# Patient Record
Sex: MALE | Race: WHITE | Employment: UNEMPLOYED | URBAN - METROPOLITAN AREA
[De-identification: names, ages, dates, MRNs, and addresses within clinical notes are randomized per-mention and may not be internally consistent; named-entity substitution may affect disease eponyms.]

---

## 2019-06-08 ENCOUNTER — APPOINTMENT (OUTPATIENT)
Dept: CT IMAGING | Age: 29
End: 2019-06-08
Payer: COMMERCIAL

## 2019-06-08 ENCOUNTER — HOSPITAL ENCOUNTER (EMERGENCY)
Age: 29
Discharge: HOME | End: 2019-06-08
Attending: EMERGENCY MEDICINE
Payer: COMMERCIAL

## 2019-06-08 ENCOUNTER — APPOINTMENT (OUTPATIENT)
Dept: GENERAL RADIOLOGY | Age: 29
End: 2019-06-08
Payer: COMMERCIAL

## 2019-06-08 VITALS
TEMPERATURE: 98.1 F | DIASTOLIC BLOOD PRESSURE: 70 MMHG | RESPIRATION RATE: 16 BRPM | OXYGEN SATURATION: 98 % | HEIGHT: 63 IN | WEIGHT: 120 LBS | BODY MASS INDEX: 21.26 KG/M2 | HEART RATE: 52 BPM | SYSTOLIC BLOOD PRESSURE: 107 MMHG

## 2019-06-08 DIAGNOSIS — K08.89 TOOTH PAIN: ICD-10-CM

## 2019-06-08 DIAGNOSIS — S06.0X0A CONCUSSION WITHOUT LOSS OF CONSCIOUSNESS, INITIAL ENCOUNTER: ICD-10-CM
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
## 2019-06-08 DIAGNOSIS — H11.31 SUBCONJUNCTIVAL HEMORRHAGE OF RIGHT EYE: ICD-10-CM

## 2019-06-08 DIAGNOSIS — R91.1 PULMONARY NODULE: ICD-10-CM

## 2019-06-08 PROCEDURE — 70486 CT MAXILLOFACIAL W/O DYE: CPT

## 2019-06-08 PROCEDURE — 90471 IMMUNIZATION ADMIN: CPT | Performed by: EMERGENCY MEDICINE

## 2019-06-08 PROCEDURE — 99284 EMERGENCY DEPT VISIT MOD MDM: CPT

## 2019-06-08 PROCEDURE — 90715 TDAP VACCINE 7 YRS/> IM: CPT | Performed by: EMERGENCY MEDICINE

## 2019-06-08 PROCEDURE — 72125 CT NECK SPINE W/O DYE: CPT

## 2019-06-08 PROCEDURE — 6360000002 HC RX W HCPCS: Performed by: EMERGENCY MEDICINE

## 2019-06-08 PROCEDURE — 71045 X-RAY EXAM CHEST 1 VIEW: CPT

## 2019-06-08 PROCEDURE — 70450 CT HEAD/BRAIN W/O DYE: CPT

## 2019-06-08 RX ORDER — ONDANSETRON 4 MG/1
4 TABLET, ORALLY DISINTEGRATING ORAL EVERY 8 HOURS PRN
Qty: 15 TABLET | Refills: 0 | Status: SHIPPED | OUTPATIENT
Start: 2019-06-08 | End: 2019-10-02

## 2019-06-08 RX ORDER — PENICILLIN V POTASSIUM 500 MG/1
500 TABLET ORAL 4 TIMES DAILY
Qty: 40 TABLET | Refills: 0 | Status: SHIPPED | OUTPATIENT
Start: 2019-06-08 | End: 2019-06-18

## 2019-06-08 RX ADMIN — TETANUS TOXOID, REDUCED DIPHTHERIA TOXOID AND ACELLULAR PERTUSSIS VACCINE, ADSORBED 0.5 ML: 5; 2.5; 8; 8; 2.5 SUSPENSION INTRAMUSCULAR at 09:47

## 2019-06-08 ASSESSMENT — ENCOUNTER SYMPTOMS
EYE REDNESS: 1
COUGH: 0
ABDOMINAL PAIN: 0
VOMITING: 1
SHORTNESS OF BREATH: 0
SORE THROAT: 0
FACIAL SWELLING: 1
DIARRHEA: 0
RHINORRHEA: 0
CONSTIPATION: 0
BACK PAIN: 0
NAUSEA: 1

## 2019-06-08 ASSESSMENT — PAIN SCALES - GENERAL: PAINLEVEL_OUTOF10: 7

## 2019-06-08 ASSESSMENT — PAIN DESCRIPTION - LOCATION: LOCATION: HEAD

## 2019-06-08 ASSESSMENT — PAIN DESCRIPTION - PAIN TYPE: TYPE: ACUTE PAIN

## 2019-09-26 VITALS
RESPIRATION RATE: 14 BRPM | HEART RATE: 75 BPM | SYSTOLIC BLOOD PRESSURE: 125 MMHG | TEMPERATURE: 98.1 F | DIASTOLIC BLOOD PRESSURE: 83 MMHG | OXYGEN SATURATION: 98 % | WEIGHT: 120 LBS | HEIGHT: 63 IN | BODY MASS INDEX: 21.26 KG/M2

## 2019-09-26 ASSESSMENT — PAIN DESCRIPTION - LOCATION: LOCATION: FACE

## 2019-09-26 ASSESSMENT — PAIN DESCRIPTION - PAIN TYPE: TYPE: ACUTE PAIN

## 2019-09-27 ENCOUNTER — APPOINTMENT (OUTPATIENT)
Dept: CT IMAGING | Age: 29
End: 2019-09-27
Payer: COMMERCIAL

## 2019-09-27 ENCOUNTER — HOSPITAL ENCOUNTER (EMERGENCY)
Age: 29
Discharge: HOME | End: 2019-09-27
Payer: COMMERCIAL

## 2019-09-27 DIAGNOSIS — L03.211 FACIAL CELLULITIS: Primary | ICD-10-CM

## 2019-09-27 PROCEDURE — 99282 EMERGENCY DEPT VISIT SF MDM: CPT

## 2019-09-27 RX ORDER — SODIUM CHLORIDE 0.9 % (FLUSH) 0.9 %
10 SYRINGE (ML) INJECTION 2 TIMES DAILY
Status: DISCONTINUED | OUTPATIENT
Start: 2019-09-27 | End: 2019-09-27 | Stop reason: HOSPADM

## 2019-10-02 ENCOUNTER — OFFICE VISIT (OUTPATIENT)
Dept: FAMILY MEDICINE CLINIC | Age: 29
End: 2019-10-02
Payer: COMMERCIAL

## 2019-10-02 VITALS
TEMPERATURE: 97.8 F | WEIGHT: 112.4 LBS | OXYGEN SATURATION: 98 % | HEART RATE: 82 BPM | BODY MASS INDEX: 20.68 KG/M2 | HEIGHT: 62 IN | DIASTOLIC BLOOD PRESSURE: 80 MMHG | SYSTOLIC BLOOD PRESSURE: 132 MMHG

## 2019-10-02 DIAGNOSIS — Z76.89 ESTABLISHING CARE WITH NEW DOCTOR, ENCOUNTER FOR: ICD-10-CM

## 2019-10-02 DIAGNOSIS — S32.008B: Primary | ICD-10-CM

## 2019-10-02 DIAGNOSIS — J45.20 MILD INTERMITTENT ASTHMA WITHOUT COMPLICATION: ICD-10-CM

## 2019-10-02 DIAGNOSIS — S22.008A: ICD-10-CM

## 2019-10-02 DIAGNOSIS — L03.211 CELLULITIS OF FACE: ICD-10-CM

## 2019-10-02 DIAGNOSIS — F19.10 POLYSUBSTANCE ABUSE (HCC): ICD-10-CM

## 2019-10-02 DIAGNOSIS — F17.200 SMOKER: ICD-10-CM

## 2019-10-02 PROCEDURE — G0444 DEPRESSION SCREEN ANNUAL: HCPCS | Performed by: FAMILY MEDICINE

## 2019-10-02 PROCEDURE — 99203 OFFICE O/P NEW LOW 30 MIN: CPT | Performed by: FAMILY MEDICINE

## 2019-10-02 RX ORDER — CEPHALEXIN 500 MG/1
500 CAPSULE ORAL
COMMUNITY
Start: 2019-09-27 | End: 2019-10-02 | Stop reason: SDUPTHER

## 2019-10-02 RX ORDER — METHYLPREDNISOLONE 4 MG/1
TABLET ORAL
COMMUNITY
Start: 2019-09-25 | End: 2021-01-13

## 2019-10-02 RX ORDER — ALBUTEROL SULFATE 90 UG/1
2 AEROSOL, METERED RESPIRATORY (INHALATION) EVERY 6 HOURS PRN
Qty: 1 INHALER | Refills: 2 | Status: SHIPPED | OUTPATIENT
Start: 2019-10-02 | End: 2021-01-13

## 2019-10-02 RX ORDER — CEPHALEXIN 500 MG/1
CAPSULE ORAL
Refills: 0 | COMMUNITY
Start: 2019-09-27 | End: 2019-10-02 | Stop reason: SDUPTHER

## 2019-10-02 RX ORDER — ALBUTEROL SULFATE 90 UG/1
2 AEROSOL, METERED RESPIRATORY (INHALATION)
COMMUNITY
Start: 2015-12-01 | End: 2019-10-02 | Stop reason: SDUPTHER

## 2019-10-02 RX ORDER — CEPHALEXIN 500 MG/1
500 CAPSULE ORAL 3 TIMES DAILY
Qty: 21 CAPSULE | Refills: 0 | Status: SHIPPED | OUTPATIENT
Start: 2019-10-02 | End: 2019-10-09

## 2019-10-02 RX ORDER — CYCLOBENZAPRINE HCL 5 MG
5 TABLET ORAL
COMMUNITY
Start: 2019-09-25 | End: 2019-10-05

## 2019-10-02 ASSESSMENT — PATIENT HEALTH QUESTIONNAIRE - PHQ9
2. FEELING DOWN, DEPRESSED OR HOPELESS: 1
SUM OF ALL RESPONSES TO PHQ QUESTIONS 1-9: 8
3. TROUBLE FALLING OR STAYING ASLEEP: 1
SUM OF ALL RESPONSES TO PHQ QUESTIONS 1-9: 8
8. MOVING OR SPEAKING SO SLOWLY THAT OTHER PEOPLE COULD HAVE NOTICED. OR THE OPPOSITE, BEING SO FIGETY OR RESTLESS THAT YOU HAVE BEEN MOVING AROUND A LOT MORE THAN USUAL: 1
SUM OF ALL RESPONSES TO PHQ9 QUESTIONS 1 & 2: 3
6. FEELING BAD ABOUT YOURSELF - OR THAT YOU ARE A FAILURE OR HAVE LET YOURSELF OR YOUR FAMILY DOWN: 0
5. POOR APPETITE OR OVEREATING: 1
9. THOUGHTS THAT YOU WOULD BE BETTER OFF DEAD, OR OF HURTING YOURSELF: 0
1. LITTLE INTEREST OR PLEASURE IN DOING THINGS: 2
10. IF YOU CHECKED OFF ANY PROBLEMS, HOW DIFFICULT HAVE THESE PROBLEMS MADE IT FOR YOU TO DO YOUR WORK, TAKE CARE OF THINGS AT HOME, OR GET ALONG WITH OTHER PEOPLE: 1
4. FEELING TIRED OR HAVING LITTLE ENERGY: 1
7. TROUBLE CONCENTRATING ON THINGS, SUCH AS READING THE NEWSPAPER OR WATCHING TELEVISION: 1

## 2019-10-02 ASSESSMENT — ENCOUNTER SYMPTOMS
BACK PAIN: 1
SHORTNESS OF BREATH: 0
CONSTIPATION: 0
COUGH: 0
ABDOMINAL PAIN: 0
DIARRHEA: 0
VOMITING: 0

## 2019-10-16 ENCOUNTER — HOSPITAL ENCOUNTER (OUTPATIENT)
Age: 29
Discharge: HOME | End: 2019-10-16
Payer: COMMERCIAL

## 2019-10-16 LAB
ALBUMIN SERPL-MCNC: 4.3 GM/DL (ref 3.4–5)
ALP BLD-CCNC: 95 IU/L (ref 40–129)
ALT SERPL-CCNC: 24 U/L (ref 10–40)
AST SERPL-CCNC: 19 IU/L (ref 15–37)
BILIRUB SERPL-MCNC: 0.2 MG/DL (ref 0–1)
BILIRUBIN DIRECT: 0.2 MG/DL (ref 0–0.3)
BILIRUBIN, INDIRECT: 0 MG/DL (ref 0–0.7)
HEPATITIS B SURFACE ANTIGEN: NON REACTIVE
HEPATITIS C ANTIBODY: NON REACTIVE
TOTAL PROTEIN: 6.2 GM/DL (ref 6.4–8.2)

## 2019-10-16 PROCEDURE — 87340 HEPATITIS B SURFACE AG IA: CPT

## 2019-10-16 PROCEDURE — 86708 HEPATITIS A ANTIBODY: CPT

## 2019-10-16 PROCEDURE — 86803 HEPATITIS C AB TEST: CPT

## 2019-10-16 PROCEDURE — 87389 HIV-1 AG W/HIV-1&-2 AB AG IA: CPT

## 2019-10-16 PROCEDURE — 36415 COLL VENOUS BLD VENIPUNCTURE: CPT

## 2019-10-16 PROCEDURE — 80076 HEPATIC FUNCTION PANEL: CPT

## 2019-10-17 LAB
HAV AB SERPL IA-ACNC: NEGATIVE
HAV AB SERPL IA-ACNC: NORMAL
Lab: NORMAL
Lab: NORMAL
TEST NAME: NORMAL

## 2020-10-15 ENCOUNTER — HOSPITAL ENCOUNTER (OUTPATIENT)
Age: 30
Discharge: HOME | End: 2020-10-15
Payer: COMMERCIAL

## 2020-10-15 LAB
ALBUMIN SERPL-MCNC: 4.8 GM/DL (ref 3.4–5)
ALP BLD-CCNC: 85 IU/L (ref 40–129)
ALT SERPL-CCNC: 7 U/L (ref 10–40)
AST SERPL-CCNC: 24 IU/L (ref 15–37)
BILIRUB SERPL-MCNC: 0.2 MG/DL (ref 0–1)
BILIRUBIN DIRECT: 0.2 MG/DL (ref 0–0.3)
BILIRUBIN, INDIRECT: 0 MG/DL (ref 0–0.7)
HEPATITIS B SURFACE ANTIGEN: NON REACTIVE
HEPATITIS C ANTIBODY: NON REACTIVE
TOTAL PROTEIN: 6.8 GM/DL (ref 6.4–8.2)

## 2020-10-15 PROCEDURE — 80076 HEPATIC FUNCTION PANEL: CPT

## 2020-10-15 PROCEDURE — 86708 HEPATITIS A ANTIBODY: CPT

## 2020-10-15 PROCEDURE — 36415 COLL VENOUS BLD VENIPUNCTURE: CPT

## 2020-10-15 PROCEDURE — 87340 HEPATITIS B SURFACE AG IA: CPT

## 2020-10-15 PROCEDURE — 86803 HEPATITIS C AB TEST: CPT

## 2020-10-15 PROCEDURE — 87389 HIV-1 AG W/HIV-1&-2 AB AG IA: CPT

## 2020-10-16 LAB — HIV SCREEN: NON REACTIVE

## 2020-10-17 LAB — HAV AB SERPL IA-ACNC: NEGATIVE

## 2021-01-13 ENCOUNTER — HOSPITAL ENCOUNTER (EMERGENCY)
Age: 31
Discharge: HOME | End: 2021-01-13
Attending: EMERGENCY MEDICINE
Payer: COMMERCIAL

## 2021-01-13 ENCOUNTER — OFFICE VISIT (OUTPATIENT)
Dept: PRIMARY CARE CLINIC | Age: 31
End: 2021-01-13
Payer: COMMERCIAL

## 2021-01-13 ENCOUNTER — HOSPITAL ENCOUNTER (OUTPATIENT)
Age: 31
Setting detail: SPECIMEN
Discharge: HOME | End: 2021-01-13
Payer: COMMERCIAL

## 2021-01-13 VITALS
HEIGHT: 63 IN | WEIGHT: 145 LBS | SYSTOLIC BLOOD PRESSURE: 132 MMHG | BODY MASS INDEX: 25.69 KG/M2 | RESPIRATION RATE: 16 BRPM | TEMPERATURE: 96.6 F | OXYGEN SATURATION: 98 % | HEART RATE: 72 BPM | DIASTOLIC BLOOD PRESSURE: 82 MMHG

## 2021-01-13 VITALS — OXYGEN SATURATION: 97 % | HEART RATE: 61 BPM | TEMPERATURE: 96.4 F

## 2021-01-13 DIAGNOSIS — R43.2 LOSS OF TASTE: ICD-10-CM

## 2021-01-13 DIAGNOSIS — R11.2 INTRACTABLE VOMITING WITH NAUSEA, UNSPECIFIED VOMITING TYPE: Primary | ICD-10-CM

## 2021-01-13 DIAGNOSIS — R68.89 FLU-LIKE SYMPTOMS: Primary | ICD-10-CM

## 2021-01-13 DIAGNOSIS — R43.0 LOSS OF SMELL: ICD-10-CM

## 2021-01-13 DIAGNOSIS — Z20.828 SARS-ASSOCIATED CORONAVIRUS EXPOSURE: ICD-10-CM

## 2021-01-13 PROCEDURE — 99213 OFFICE O/P EST LOW 20 MIN: CPT | Performed by: NURSE PRACTITIONER

## 2021-01-13 PROCEDURE — G8427 DOCREV CUR MEDS BY ELIG CLIN: HCPCS | Performed by: NURSE PRACTITIONER

## 2021-01-13 PROCEDURE — 99284 EMERGENCY DEPT VISIT MOD MDM: CPT

## 2021-01-13 PROCEDURE — G8484 FLU IMMUNIZE NO ADMIN: HCPCS | Performed by: NURSE PRACTITIONER

## 2021-01-13 PROCEDURE — U0002 COVID-19 LAB TEST NON-CDC: HCPCS

## 2021-01-13 PROCEDURE — 4004F PT TOBACCO SCREEN RCVD TLK: CPT | Performed by: NURSE PRACTITIONER

## 2021-01-13 PROCEDURE — 6370000000 HC RX 637 (ALT 250 FOR IP): Performed by: EMERGENCY MEDICINE

## 2021-01-13 PROCEDURE — G8419 CALC BMI OUT NRM PARAM NOF/U: HCPCS | Performed by: NURSE PRACTITIONER

## 2021-01-13 RX ORDER — ONDANSETRON 4 MG/1
8 TABLET, ORALLY DISINTEGRATING ORAL ONCE
Status: COMPLETED | OUTPATIENT
Start: 2021-01-13 | End: 2021-01-13

## 2021-01-13 RX ORDER — ONDANSETRON 4 MG/1
4 TABLET, ORALLY DISINTEGRATING ORAL EVERY 6 HOURS PRN
Qty: 20 TABLET | Refills: 0 | Status: SHIPPED | OUTPATIENT
Start: 2021-01-13 | End: 2021-01-20

## 2021-01-13 RX ADMIN — ONDANSETRON 8 MG: 4 TABLET, ORALLY DISINTEGRATING ORAL at 09:31

## 2021-01-13 ASSESSMENT — PAIN DESCRIPTION - LOCATION: LOCATION: HEAD

## 2021-01-13 ASSESSMENT — PAIN DESCRIPTION - DESCRIPTORS: DESCRIPTORS: ACHING

## 2021-01-13 ASSESSMENT — PAIN DESCRIPTION - PAIN TYPE: TYPE: ACUTE PAIN

## 2021-01-13 NOTE — ED PROVIDER NOTES
Patient Name: Rubina Menendez      HPI:   Patient presents to the ED with chief complaint of headache, generalized myalgias and arthralgias, malaise and fatigue, intractable vomiting and low-grade fever. [facility], was released 3 days ago, and symptoms began yesterday. States his major complaint has been intractable vomiting, he said multiple episodes since last night. Is having difficulty keeping food and drinks down. He was able to take some Motrin this morning for the aches, otherwise has had nothing to eat or drink. Does not know if he was exposed to coronavirus while in the senior living, but says they were not practicing social distancing, wearing masks or practicing handwashing. Also reports loss of sense of taste and smell. REVIEW OF SYSTEMS    General: Chills and subjective fever. Afebrile here, who did take Motrin prior to arrival  Eyes:  No recent vison changes  ENT:  No sore throat, no nasal congestion  Cardiovascular: No chest pain, no palpitations  Respiratory:   No shortness of breath, states he has a chronic cough from tobacco use, but no change in baseline  Gastrointestinal: No abdominal pain, nausea vomiting as above, no diarrhea  Musculoskeletal: As above  Skin:  No rash, no pruritis  Neurologic:  No speech problems, no headache, no extremity weakness, no difficulty walking  Genitourinary:  No dysuria or hematuria present  Extremities:  No calf pain, no edema, No pain      Past Medical  Past Medical History:   Diagnosis Date    Asthma        Past Surgical History  Past Surgical History:   Procedure Laterality Date    FINGER SURGERY Left     thumb ligament       Past Family History  History reviewed. No pertinent family history.     Social History  Social History     Socioeconomic History    Marital status: Single     Spouse name: Not on file    Number of children: Not on file    Years of education: Not on file    Highest education level: Not on file Wt 145 lb (65.8 kg)   SpO2 98%   BMI 25.69 kg/m²    Constitutional: Well-developed, well-nourished and in no acute distress  Head: Atraumatic, normocephalic. Eyes: PERRL. EOMI. no scleral icterus. Neck/Lymphatics: Supple, no JVD, No lymphadenopathy  Cardiovascular: Regular rate and normal rhythm. No murmur or gallop noted. No JVD. Peripheral Vascular: Pulses +2 and equal on both radial arteries, and both dorsalis pedis arteries bilaterally. Respiratory: Clear to auscultation bilaterally, no increased work of breathing or respiratory distress noted. No costal retractions. Speaking full sentences comfortably. Abdomen: Abdomen soft and without distention. No rebound or guarding noted. No abdominal bruit or pulsatile abdominal mass. Musculoskeletal: No lower extremity edema, no calf tenderness or Homans' sign noted. Integument:  Warm, Dry, Intact, appropriate skin turgor with no mottling  Neurologic:  Alert & oriented x3 , no gross or focal neurologic deficits noted, no ataxia. Cranial nerves II through XII are intact. MEDICAL DECISION MAKING/ ASSESSMENT AND PLAN    Patiently recently in the [facility] for 3 days, released 3 days ago and beginning yesterday has multiple flulike symptoms which would be consistent with coronavirus exposure. Most concerning would be a loss of sense of taste and smell. Patient was given Zofran here for his nausea and vomiting. Was also given information on coronavirus exposure, self quarantine, and information on the [address] [place] [address] testing by appointment. Clinically, physical exam is unremarkable. Review of systems is suggestive of viral infection, but see no evidence of pneumonia. To include no hypoxemia, no tachypnea, clear breath sounds and no hypoxemia. DIAGNOSIS    1. Intractable vomiting with nausea, unspecified vomiting type    2.  SARS-associated coronavirus exposure            GILMER PEREZ MD, CPE, [address], [address] [facility]  [facility] and [address], [place]  01/13/21 5910

## 2021-01-13 NOTE — PROGRESS NOTES
PHYSICAL EXAM:  Physical Exam  Constitutional:       Appearance: Normal appearance. HENT:      Head: Normocephalic. Neck:      Musculoskeletal: Neck supple. Cardiovascular:      Rate and Rhythm: Normal rate and regular rhythm. Heart sounds: Normal heart sounds. Pulmonary:      Effort: Pulmonary effort is normal.      Breath sounds: Normal breath sounds. Skin:     General: Skin is warm and dry. Neurological:      Mental Status: He is alert and oriented to person, place, and time. Psychiatric:         Mood and Affect: Mood normal.         Behavior: Behavior normal.         ASSESSMENT/PLAN:  1. Flu-like symptoms  Your COVID 19 test can take 3-5 days for the results come back. We ask that you make a Mychart page and view your test results this way. You will need to Self quarantine until you know your results. Increase fluids rest  Saline nasal spray as directed  Warm salt gargles for throat discomfort  Monitor temperature twice a day  Tylenol for fevers and/or discomfort. If symptoms are worse -Go to the ER. Follow up with your primary doctor  - COVID-19 Ambulatory    2. Loss of taste  - COVID-19 Ambulatory    3. Loss of smell  - COVID-19 Ambulatory      FOLLOW-UP:  Return if symptoms worsen or fail to improve.     In addition to other information, the printed after visit summary provided to the patient includes:  [x] COVID-19 Self care instructions  [x] COVID-19 General patient information

## 2021-01-13 NOTE — PATIENT INSTRUCTIONS
Your COVID 19 test can take 3-5 days for the results come back. We ask that you make a Mychart page and view your test results this way. You will need to Self quarantine until you know your results. Increase fluids rest  Saline nasal spray as directed  Warm salt gargles for throat discomfort  Monitor temperature twice a day  Tylenol for fevers and/or discomfort. If symptoms are worse -Go to the ER. Follow up with your primary doctor    To Whom it May Concern:    Destiny Will has been tested for COVID on 01/13/21. They may NOT return to work until their lab test results back and they been fever free for 3 days. If test is positive they must stay home for 2 weeks or until they test negative or as directed by the [facility] Department.

## 2021-01-13 NOTE — ED NOTES
The patient did not disclose to me that he was just in care home for 3 days and that he also has no sense of taste or smell. He disclosed this information to the physician.               Breana Smith RN  01/13/21 2705

## 2021-01-13 NOTE — ED NOTES
The patient presents to the er today with complaints of vomiting, headache, and general body aches since yesterday. Reports that it has continued today. Reports of vomiting just 5 minutes prior to arrival. Call light is within reach.                               Breana Smith RN  01/13/21 5213

## 2021-01-13 NOTE — ED NOTES
The patient was medicated as ordered, he did not have any emesis while he was here. He was given resources for COVID-19 testing. He was advised to self quarantine for 14 days and or until his symptoms have resolved for 72 hours. He patient understands these directions.      Maya Tariq RN  01/13/21 9781

## 2021-01-14 ENCOUNTER — CARE COORDINATION (OUTPATIENT)
Dept: CARE COORDINATION | Age: 31
End: 2021-01-14

## 2021-01-14 NOTE — CARE COORDINATION
Call to check on pt status after recent [address]  clinic visit for headache, generalized myalgias and arthralgias, malaise and fatigue, intractable vomiting and low-grade fever, loss of taste & smell. Patient contacted regarding Eyal [facility]ks[facility]. Discussed COVID-19 related testing which was pending at this time. Test results were pending. Patient informed of results, if available? Pt advised of pending test results & how to access through [address] Care Transition Nurse/ Ambulatory Care Manager contacted the patient by telephone to perform post discharge assessment. Call within 2 business days of discharge: Yes. Verified name and  with patient as identifiers. Provided introduction to self, and explanation of the CTN/ACM role, and reason for call due to risk factors for infection and/or exposure to COVID-19. Symptoms reviewed with patient who verbalized the following symptoms: fatigue, pain or aching joints, loss of taste or smell, nausea, no new symptoms and no worsening symptoms. Due to no new or worsening symptoms encounter was not routed to provider for escalation. Discussed follow-up appointments. If no appointment was previously scheduled, appointment scheduling offered: Yes  [facility] follow up appointment(s): No future appointments. Non-[facility] follow up appointment(s): Pt referred to [facility] for new or worsening symptoms due to no PCP. Non-face-to-face services provided:  Obtained and reviewed discharge summary and/or continuity of care documents  Reviewed and followed up on pending diagnostic tests and treatments-COVID19     Advance Care Planning:   Does patient have an Advance Directive:  not on file. Patient has following risk factors of: asthma and current smoker. CTN/ACM reviewed discharge instructions, medical action plan and red flags such as increased shortness of breath, increasing fever and signs of decompensation with patient who verbalized understanding.    Discussed exposure

## 2021-01-15 LAB
SARS-COV-2: NOT DETECTED
SOURCE: NORMAL

## 2021-10-25 ENCOUNTER — HOSPITAL ENCOUNTER (OUTPATIENT)
Age: 31
Discharge: HOME | End: 2021-10-25
Payer: COMMERCIAL

## 2021-10-25 LAB
ALBUMIN SERPL-MCNC: 4.2 GM/DL (ref 3.4–5)
ALP BLD-CCNC: 92 IU/L (ref 40–129)
ALT SERPL-CCNC: 18 U/L (ref 10–40)
AST SERPL-CCNC: 20 IU/L (ref 15–37)
BILIRUB SERPL-MCNC: 0.2 MG/DL (ref 0–1)
BILIRUBIN DIRECT: 0.2 MG/DL (ref 0–0.3)
BILIRUBIN, INDIRECT: 0 MG/DL (ref 0–0.7)
HEPATITIS B SURFACE ANTIGEN: NON REACTIVE
HEPATITIS C ANTIBODY: NON REACTIVE
TOTAL PROTEIN: 6.4 GM/DL (ref 6.4–8.2)

## 2021-10-25 PROCEDURE — 80076 HEPATIC FUNCTION PANEL: CPT

## 2021-10-25 PROCEDURE — 87340 HEPATITIS B SURFACE AG IA: CPT

## 2021-10-25 PROCEDURE — 36415 COLL VENOUS BLD VENIPUNCTURE: CPT

## 2021-10-25 PROCEDURE — 86803 HEPATITIS C AB TEST: CPT

## 2021-10-25 PROCEDURE — 86708 HEPATITIS A ANTIBODY: CPT

## 2021-10-25 PROCEDURE — 87389 HIV-1 AG W/HIV-1&-2 AB AG IA: CPT

## 2021-10-27 LAB
HAV AB SERPL IA-ACNC: NEGATIVE
HIV SCREEN: NON REACTIVE

## 2022-08-23 ENCOUNTER — HOSPITAL ENCOUNTER (EMERGENCY)
Age: 32
Discharge: HOME | End: 2022-08-24
Attending: EMERGENCY MEDICINE
Payer: COMMERCIAL

## 2022-08-23 DIAGNOSIS — M54.9 ACUTE BACK PAIN, UNSPECIFIED BACK LOCATION, UNSPECIFIED BACK PAIN LATERALITY: ICD-10-CM
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
## 2022-08-23 DIAGNOSIS — R91.8 LUNG NODULES: ICD-10-CM

## 2022-08-23 PROCEDURE — 96372 THER/PROPH/DIAG INJ SC/IM: CPT

## 2022-08-23 PROCEDURE — 99285 EMERGENCY DEPT VISIT HI MDM: CPT

## 2022-08-23 RX ORDER — LIDOCAINE 4 G/G
1 PATCH TOPICAL ONCE
Status: DISCONTINUED | OUTPATIENT
Start: 2022-08-24 | End: 2022-08-24 | Stop reason: HOSPADM

## 2022-08-23 RX ORDER — KETOROLAC TROMETHAMINE 30 MG/ML
30 INJECTION, SOLUTION INTRAMUSCULAR; INTRAVENOUS ONCE
Status: COMPLETED | OUTPATIENT
Start: 2022-08-24 | End: 2022-08-24

## 2022-08-23 RX ORDER — METHOCARBAMOL 750 MG/1
750 TABLET, FILM COATED ORAL ONCE
Status: COMPLETED | OUTPATIENT
Start: 2022-08-24 | End: 2022-08-24

## 2022-08-23 ASSESSMENT — PAIN SCALES - GENERAL: PAINLEVEL_OUTOF10: 10

## 2022-08-23 ASSESSMENT — PAIN DESCRIPTION - ORIENTATION: ORIENTATION: MID;LOWER

## 2022-08-23 ASSESSMENT — PAIN DESCRIPTION - LOCATION: LOCATION: BACK

## 2022-08-23 ASSESSMENT — PAIN - FUNCTIONAL ASSESSMENT: PAIN_FUNCTIONAL_ASSESSMENT: 0-10

## 2022-08-23 ASSESSMENT — PAIN DESCRIPTION - DESCRIPTORS: DESCRIPTORS: BURNING

## 2022-08-23 ASSESSMENT — PAIN DESCRIPTION - PAIN TYPE: TYPE: ACUTE PAIN

## 2022-08-24 ENCOUNTER — APPOINTMENT (OUTPATIENT)
Dept: CT IMAGING | Age: 32
End: 2022-08-24
Payer: COMMERCIAL

## 2022-08-24 ENCOUNTER — APPOINTMENT (OUTPATIENT)
Dept: GENERAL RADIOLOGY | Age: 32
End: 2022-08-24
Payer: COMMERCIAL

## 2022-08-24 VITALS
WEIGHT: 145 LBS | HEART RATE: 70 BPM | TEMPERATURE: 97.6 F | OXYGEN SATURATION: 93 % | BODY MASS INDEX: 25.69 KG/M2 | SYSTOLIC BLOOD PRESSURE: 95 MMHG | DIASTOLIC BLOOD PRESSURE: 53 MMHG | RESPIRATION RATE: 16 BRPM | HEIGHT: 63 IN

## 2022-08-24 LAB
ALBUMIN SERPL-MCNC: 4.5 GM/DL (ref 3.4–5)
ALP BLD-CCNC: 82 IU/L (ref 40–129)
ALT SERPL-CCNC: 27 U/L (ref 10–40)
ANION GAP SERPL CALCULATED.3IONS-SCNC: 11 MMOL/L (ref 4–16)
AST SERPL-CCNC: 28 IU/L (ref 15–37)
BASOPHILS ABSOLUTE: 0.1 K/CU MM
BASOPHILS RELATIVE PERCENT: 0.9 % (ref 0–1)
BILIRUB SERPL-MCNC: 0.3 MG/DL (ref 0–1)
BUN BLDV-MCNC: 20 MG/DL (ref 6–23)
CALCIUM SERPL-MCNC: 9.4 MG/DL (ref 8.3–10.6)
CHLORIDE BLD-SCNC: 99 MMOL/L (ref 99–110)
CO2: 27 MMOL/L (ref 21–32)
CREAT SERPL-MCNC: 0.8 MG/DL (ref 0.9–1.3)
DIFFERENTIAL TYPE: ABNORMAL
EOSINOPHILS ABSOLUTE: 0.2 K/CU MM
EOSINOPHILS RELATIVE PERCENT: 2 % (ref 0–3)
GFR AFRICAN AMERICAN: >60 ML/MIN/1.73M2
GFR NON-AFRICAN AMERICAN: >60 ML/MIN/1.73M2
GLUCOSE BLD-MCNC: 109 MG/DL (ref 70–99)
HCT VFR BLD CALC: 41.1 % (ref 42–52)
HEMOGLOBIN: 14.1 GM/DL (ref 13.5–18)
IMMATURE NEUTROPHIL %: 0.7 % (ref 0–0.43)
LYMPHOCYTES ABSOLUTE: 1.3 K/CU MM
LYMPHOCYTES RELATIVE PERCENT: 12.5 % (ref 24–44)
MCH RBC QN AUTO: 29.8 PG (ref 27–31)
MCHC RBC AUTO-ENTMCNC: 34.3 % (ref 32–36)
MCV RBC AUTO: 86.9 FL (ref 78–100)
MONOCYTES ABSOLUTE: 0.7 K/CU MM
MONOCYTES RELATIVE PERCENT: 6.5 % (ref 0–4)
PDW BLD-RTO: 13.2 % (ref 11.7–14.9)
PLATELET # BLD: 191 K/CU MM (ref 140–440)
PMV BLD AUTO: 8.7 FL (ref 7.5–11.1)
POTASSIUM SERPL-SCNC: 4.1 MMOL/L (ref 3.5–5.1)
RBC # BLD: 4.73 M/CU MM (ref 4.6–6.2)
SEGMENTED NEUTROPHILS ABSOLUTE COUNT: 8.2 K/CU MM
SEGMENTED NEUTROPHILS RELATIVE PERCENT: 77.4 % (ref 36–66)
SODIUM BLD-SCNC: 137 MMOL/L (ref 135–145)
TOTAL IMMATURE NEUTOROPHIL: 0.07 K/CU MM
TOTAL PROTEIN: 7 GM/DL (ref 6.4–8.2)
WBC # BLD: 10.6 K/CU MM (ref 4–10.5)

## 2022-08-24 PROCEDURE — 6360000002 HC RX W HCPCS: Performed by: EMERGENCY MEDICINE

## 2022-08-24 PROCEDURE — 72072 X-RAY EXAM THORAC SPINE 3VWS: CPT

## 2022-08-24 PROCEDURE — 85025 COMPLETE CBC W/AUTO DIFF WBC: CPT

## 2022-08-24 PROCEDURE — 80053 COMPREHEN METABOLIC PANEL: CPT

## 2022-08-24 PROCEDURE — 76376 3D RENDER W/INTRP POSTPROCES: CPT

## 2022-08-24 PROCEDURE — 72125 CT NECK SPINE W/O DYE: CPT

## 2022-08-24 PROCEDURE — 6360000004 HC RX CONTRAST MEDICATION: Performed by: EMERGENCY MEDICINE

## 2022-08-24 PROCEDURE — 72100 X-RAY EXAM L-S SPINE 2/3 VWS: CPT

## 2022-08-24 PROCEDURE — 74177 CT ABD & PELVIS W/CONTRAST: CPT

## 2022-08-24 PROCEDURE — 96372 THER/PROPH/DIAG INJ SC/IM: CPT

## 2022-08-24 PROCEDURE — 6370000000 HC RX 637 (ALT 250 FOR IP): Performed by: EMERGENCY MEDICINE

## 2022-08-24 PROCEDURE — 71260 CT THORAX DX C+: CPT

## 2022-08-24 PROCEDURE — 70450 CT HEAD/BRAIN W/O DYE: CPT

## 2022-08-24 RX ADMIN — METHOCARBAMOL 750 MG: 750 TABLET ORAL at 00:30

## 2022-08-24 RX ADMIN — KETOROLAC TROMETHAMINE 30 MG: 30 INJECTION, SOLUTION INTRAMUSCULAR at 00:25

## 2022-08-24 RX ADMIN — IOPAMIDOL 100 ML: 755 INJECTION, SOLUTION INTRAVENOUS at 01:54

## 2022-08-24 ASSESSMENT — PAIN DESCRIPTION - ORIENTATION
ORIENTATION: MID;LOWER

## 2022-08-24 ASSESSMENT — PAIN DESCRIPTION - DESCRIPTORS: DESCRIPTORS: TIGHTNESS

## 2022-08-24 ASSESSMENT — PAIN SCALES - GENERAL
PAINLEVEL_OUTOF10: 7
PAINLEVEL_OUTOF10: 7
PAINLEVEL_OUTOF10: 10

## 2022-08-24 ASSESSMENT — PAIN DESCRIPTION - LOCATION
LOCATION: BACK

## 2022-08-24 ASSESSMENT — ENCOUNTER SYMPTOMS
COUGH: 0
RHINORRHEA: 0
ABDOMINAL PAIN: 0
NAUSEA: 0
SHORTNESS OF BREATH: 0
VOMITING: 0
SORE THROAT: 0
EYE PAIN: 0
EYE DISCHARGE: 0
BACK PAIN: 1

## 2022-08-24 ASSESSMENT — PAIN DESCRIPTION - PAIN TYPE: TYPE: ACUTE PAIN
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
## 2024-08-09 ENCOUNTER — HOSPITAL ENCOUNTER (OUTPATIENT)
Age: 34
Discharge: HOME | End: 2024-08-09
Payer: COMMERCIAL

## 2024-08-09 LAB
ALBUMIN SERPL-MCNC: 4.2 GM/DL (ref 3.4–5)
ALP BLD-CCNC: 80 IU/L (ref 40–129)
ALT SERPL-CCNC: 14 U/L (ref 10–40)
AST SERPL-CCNC: 14 IU/L (ref 15–37)
BILIRUB SERPL-MCNC: 0.3 MG/DL (ref 0–1)
BILIRUBIN DIRECT: 0.2 MG/DL (ref 0–0.3)
BILIRUBIN, INDIRECT: 0.1 MG/DL (ref 0–0.7)
HBV SURFACE AG SERPL QL IA: NON REACTIVE
HCV AB SERPL QL IA: NON REACTIVE
HIV 1+2 AB+HIV1P24 AG SERPLBLD IA.RAPID: NON REACTIVE
T. PALLIDUM SCREEN: NEGATIVE
TOTAL PROTEIN: 6.9 GM/DL (ref 6.4–8.2)

## 2024-08-09 PROCEDURE — 87389 HIV-1 AG W/HIV-1&-2 AB AG IA: CPT

## 2024-08-09 PROCEDURE — 87340 HEPATITIS B SURFACE AG IA: CPT

## 2024-08-09 PROCEDURE — 86708 HEPATITIS A ANTIBODY: CPT

## 2024-08-09 PROCEDURE — 86803 HEPATITIS C AB TEST: CPT

## 2024-08-09 PROCEDURE — 86780 TREPONEMA PALLIDUM: CPT

## 2024-08-09 PROCEDURE — 36415 COLL VENOUS BLD VENIPUNCTURE: CPT

## 2024-08-09 PROCEDURE — 80076 HEPATIC FUNCTION PANEL: CPT

## 2024-08-11 LAB — HAV AB SER QL IA: NEGATIVE
